# Patient Record
Sex: FEMALE | Race: WHITE | NOT HISPANIC OR LATINO | Employment: OTHER | URBAN - NONMETROPOLITAN AREA
[De-identification: names, ages, dates, MRNs, and addresses within clinical notes are randomized per-mention and may not be internally consistent; named-entity substitution may affect disease eponyms.]

---

## 2020-03-09 PROBLEM — I48.0 PAF (PAROXYSMAL ATRIAL FIBRILLATION) (HCC): Status: ACTIVE | Noted: 2020-03-09

## 2020-06-13 ENCOUNTER — LAB REQUISITION (OUTPATIENT)
Dept: LAB | Facility: HOSPITAL | Age: 80
End: 2020-06-13

## 2020-06-13 DIAGNOSIS — E55.9 VITAMIN D DEFICIENCY, UNSPECIFIED: ICD-10-CM

## 2020-06-13 DIAGNOSIS — E64.2 SEQUELAE OF VITAMIN C DEFICIENCY: ICD-10-CM

## 2020-06-13 DIAGNOSIS — D64.9 ANEMIA, UNSPECIFIED: ICD-10-CM

## 2020-06-13 DIAGNOSIS — I15.9 SECONDARY HYPERTENSION, UNSPECIFIED: ICD-10-CM

## 2020-06-13 LAB
ALBUMIN SERPL-MCNC: 3.75 G/DL (ref 3.5–5.2)
ALBUMIN/GLOB SERPL: 1.1 G/DL
ALP SERPL-CCNC: 128 U/L (ref 39–117)
ALT SERPL W P-5'-P-CCNC: 11 U/L (ref 1–33)
ANION GAP SERPL CALCULATED.3IONS-SCNC: 9.2 MMOL/L (ref 5–15)
AST SERPL-CCNC: 17 U/L (ref 1–32)
BASOPHILS # BLD AUTO: 0.1 10*3/MM3 (ref 0–0.2)
BASOPHILS NFR BLD AUTO: 1 % (ref 0–1.5)
BILIRUB SERPL-MCNC: 0.2 MG/DL (ref 0.2–1.2)
BUN BLD-MCNC: 13 MG/DL (ref 8–23)
BUN/CREAT SERPL: 21.7 (ref 7–25)
CALCIUM SPEC-SCNC: 9.4 MG/DL (ref 8.6–10.5)
CHLORIDE SERPL-SCNC: 105 MMOL/L (ref 98–107)
CO2 SERPL-SCNC: 22.8 MMOL/L (ref 22–29)
CREAT BLD-MCNC: 0.6 MG/DL (ref 0.57–1)
DEPRECATED RDW RBC AUTO: 48.5 FL (ref 37–54)
EOSINOPHIL # BLD AUTO: 0.19 10*3/MM3 (ref 0–0.4)
EOSINOPHIL NFR BLD AUTO: 1.9 % (ref 0.3–6.2)
ERYTHROCYTE [DISTWIDTH] IN BLOOD BY AUTOMATED COUNT: 14 % (ref 12.3–15.4)
GFR SERPL CREATININE-BSD FRML MDRD: 117 ML/MIN/1.73
GFR SERPL CREATININE-BSD FRML MDRD: 96 ML/MIN/1.73
GLOBULIN UR ELPH-MCNC: 3.4 GM/DL
GLUCOSE BLD-MCNC: 96 MG/DL (ref 65–99)
HCT VFR BLD AUTO: 33.3 % (ref 34–46.6)
HGB BLD-MCNC: 10.2 G/DL (ref 12–15.9)
IMM GRANULOCYTES # BLD AUTO: 0.15 10*3/MM3 (ref 0–0.05)
IMM GRANULOCYTES NFR BLD AUTO: 1.5 % (ref 0–0.5)
LYMPHOCYTES # BLD AUTO: 3.83 10*3/MM3 (ref 0.7–3.1)
LYMPHOCYTES NFR BLD AUTO: 38.6 % (ref 19.6–45.3)
MCH RBC QN AUTO: 29.3 PG (ref 26.6–33)
MCHC RBC AUTO-ENTMCNC: 30.6 G/DL (ref 31.5–35.7)
MCV RBC AUTO: 95.7 FL (ref 79–97)
MONOCYTES # BLD AUTO: 0.89 10*3/MM3 (ref 0.1–0.9)
MONOCYTES NFR BLD AUTO: 9 % (ref 5–12)
NEUTROPHILS # BLD AUTO: 4.76 10*3/MM3 (ref 1.7–7)
NEUTROPHILS NFR BLD AUTO: 48 % (ref 42.7–76)
NRBC BLD AUTO-RTO: 0 /100 WBC (ref 0–0.2)
PLAT MORPH BLD: NORMAL
PLATELET # BLD AUTO: 507 10*3/MM3 (ref 140–450)
PMV BLD AUTO: 9.5 FL (ref 6–12)
POTASSIUM BLD-SCNC: 7 MMOL/L (ref 3.5–5.2)
PROT SERPL-MCNC: 7.1 G/DL (ref 6–8.5)
RBC # BLD AUTO: 3.48 10*6/MM3 (ref 3.77–5.28)
RBC MORPH BLD: NORMAL
SODIUM BLD-SCNC: 137 MMOL/L (ref 136–145)
WBC NRBC COR # BLD: 9.92 10*3/MM3 (ref 3.4–10.8)

## 2020-06-13 PROCEDURE — 80053 COMPREHEN METABOLIC PANEL: CPT | Performed by: INTERNAL MEDICINE

## 2020-06-13 PROCEDURE — 85007 BL SMEAR W/DIFF WBC COUNT: CPT | Performed by: INTERNAL MEDICINE

## 2020-06-13 PROCEDURE — 82652 VIT D 1 25-DIHYDROXY: CPT | Performed by: INTERNAL MEDICINE

## 2020-06-13 PROCEDURE — 85025 COMPLETE CBC W/AUTO DIFF WBC: CPT | Performed by: INTERNAL MEDICINE

## 2020-06-14 ENCOUNTER — LAB REQUISITION (OUTPATIENT)
Dept: LAB | Facility: HOSPITAL | Age: 80
End: 2020-06-14

## 2020-06-14 DIAGNOSIS — I10 ESSENTIAL (PRIMARY) HYPERTENSION: ICD-10-CM

## 2020-06-14 DIAGNOSIS — M62.81 MUSCLE WEAKNESS (GENERALIZED): ICD-10-CM

## 2020-06-14 LAB
ANION GAP SERPL CALCULATED.3IONS-SCNC: 11.3 MMOL/L (ref 5–15)
BUN BLD-MCNC: 15 MG/DL (ref 8–23)
BUN/CREAT SERPL: 23.4 (ref 7–25)
CALCIUM SPEC-SCNC: 9.2 MG/DL (ref 8.6–10.5)
CHLORIDE SERPL-SCNC: 105 MMOL/L (ref 98–107)
CO2 SERPL-SCNC: 23.7 MMOL/L (ref 22–29)
CREAT BLD-MCNC: 0.64 MG/DL (ref 0.57–1)
GFR SERPL CREATININE-BSD FRML MDRD: 108 ML/MIN/1.73
GFR SERPL CREATININE-BSD FRML MDRD: 89 ML/MIN/1.73
GLUCOSE BLD-MCNC: 117 MG/DL (ref 65–99)
POTASSIUM BLD-SCNC: 3.6 MMOL/L (ref 3.5–5.2)
SODIUM BLD-SCNC: 140 MMOL/L (ref 136–145)

## 2020-06-14 PROCEDURE — 80048 BASIC METABOLIC PNL TOTAL CA: CPT | Performed by: INTERNAL MEDICINE

## 2020-06-16 ENCOUNTER — LAB REQUISITION (OUTPATIENT)
Dept: LAB | Facility: HOSPITAL | Age: 80
End: 2020-06-16

## 2020-06-16 DIAGNOSIS — I10 ESSENTIAL (PRIMARY) HYPERTENSION: ICD-10-CM

## 2020-06-16 LAB
1,25(OH)2D3 SERPL-MCNC: 11.8 PG/ML (ref 19.9–79.3)
ANION GAP SERPL CALCULATED.3IONS-SCNC: 13.2 MMOL/L (ref 5–15)
BUN BLD-MCNC: 14 MG/DL (ref 8–23)
BUN/CREAT SERPL: 20.6 (ref 7–25)
CALCIUM SPEC-SCNC: 9.2 MG/DL (ref 8.6–10.5)
CHLORIDE SERPL-SCNC: 106 MMOL/L (ref 98–107)
CO2 SERPL-SCNC: 19.8 MMOL/L (ref 22–29)
CREAT BLD-MCNC: 0.68 MG/DL (ref 0.57–1)
GFR SERPL CREATININE-BSD FRML MDRD: 83 ML/MIN/1.73
GLUCOSE BLD-MCNC: 136 MG/DL (ref 65–99)
POTASSIUM BLD-SCNC: 3.9 MMOL/L (ref 3.5–5.2)
SODIUM BLD-SCNC: 139 MMOL/L (ref 136–145)

## 2020-06-16 PROCEDURE — 80048 BASIC METABOLIC PNL TOTAL CA: CPT | Performed by: INTERNAL MEDICINE

## 2020-08-13 ENCOUNTER — CONSULT (OUTPATIENT)
Dept: CARDIOLOGY | Facility: CLINIC | Age: 80
End: 2020-08-13

## 2020-08-13 VITALS
WEIGHT: 130 LBS | DIASTOLIC BLOOD PRESSURE: 61 MMHG | BODY MASS INDEX: 25.52 KG/M2 | HEART RATE: 88 BPM | SYSTOLIC BLOOD PRESSURE: 102 MMHG | HEIGHT: 60 IN

## 2020-08-13 DIAGNOSIS — E78.2 MIXED HYPERLIPIDEMIA: ICD-10-CM

## 2020-08-13 DIAGNOSIS — I48.0 PAF (PAROXYSMAL ATRIAL FIBRILLATION) (HCC): Primary | ICD-10-CM

## 2020-08-13 PROCEDURE — 99214 OFFICE O/P EST MOD 30 MIN: CPT | Performed by: INTERNAL MEDICINE

## 2020-08-13 PROCEDURE — 93000 ELECTROCARDIOGRAM COMPLETE: CPT | Performed by: INTERNAL MEDICINE

## 2020-08-13 RX ORDER — FLECAINIDE ACETATE 150 MG/1
150 TABLET ORAL 2 TIMES DAILY
COMMUNITY
End: 2020-08-13 | Stop reason: SDUPTHER

## 2020-08-13 RX ORDER — FLECAINIDE ACETATE 150 MG/1
150 TABLET ORAL 2 TIMES DAILY
Qty: 60 TABLET | Refills: 5 | Status: SHIPPED | OUTPATIENT
Start: 2020-08-13 | End: 2021-01-26 | Stop reason: SDUPTHER

## 2020-08-13 RX ORDER — CHOLECALCIFEROL (VITAMIN D3) 125 MCG
5 CAPSULE ORAL NIGHTLY
COMMUNITY
End: 2021-03-09

## 2020-08-13 RX ORDER — OMEPRAZOLE 40 MG/1
20 CAPSULE, DELAYED RELEASE ORAL DAILY
COMMUNITY

## 2020-08-13 RX ORDER — METOPROLOL SUCCINATE 25 MG/1
25 TABLET, EXTENDED RELEASE ORAL DAILY
COMMUNITY

## 2020-08-13 RX ORDER — ACETAMINOPHEN 160 MG
2000 TABLET,DISINTEGRATING ORAL DAILY
COMMUNITY

## 2020-08-13 RX ORDER — METOPROLOL SUCCINATE AND HYDROCHLOROTHIAZIDE 25; 12.5 MG/1; MG/1
25 TABLET ORAL
COMMUNITY
End: 2020-08-13

## 2020-08-13 RX ORDER — ACETAMINOPHEN 500 MG
500 TABLET ORAL EVERY 6 HOURS PRN
COMMUNITY

## 2020-08-13 RX ORDER — HYDROCODONE BITARTRATE AND ACETAMINOPHEN 7.5; 325 MG/1; MG/1
1 TABLET ORAL EVERY 6 HOURS PRN
COMMUNITY

## 2020-08-13 NOTE — PROGRESS NOTES
[facility]  INITIAL OFFICE CONSULT    Genet Bryant  : 1940  MRN:[id number]  Patient Address: [address]  [facility] [address]    Date of Encounter: 2020    PCP: Yokasta Ramos, APRN  [address] [facility] [address]  Referring MD: Yokasta Ramos, GEOVANI     IDENTIFICATION: A 79 y.o. female resident of [place], [place]     CC:  Paroxysmal atrial fibrillation/establish care    PROBLEM LIST:   1. Paroxysmal atrial fibrillation  a. Remote ablation, , IDB  b. LHC 2019 [place]: nonobstructive CAD, EF 70-75%  c. Echo 2019: EF 55-65%, grade I diastolic dysfunction   d. CHADsVASC2 = 3, on Xarelto and Flecainide   2. Hyperlipidemia, intolerant to statins and refuses Praluent   3. IBS  4. GERD  5. Back pain     ALLERGIES:   Allergies   Allergen Reactions   • Statins Palpitations     CURRENT MEDICATIONS:   •  acetaminophen (TYLENOL) 500 MG tablet, Take 500 mg by mouth Every 6 (Six) Hours As Needed for Mild Pain  •  Cholecalciferol (VITAMIN D3) 50 MCG (2000 UT) capsule, Take 2,000 Units by mouth Daily  •  diphenhydrAMINE HCl (BENADRYL ALLERGY PO), Take  by mouth As Needed.  •  flecainide (TAMBOCOR) 150 MG tablet, Take 150 mg by mouth 2 (Two) Times a Day (PATIENT TAKING ONCE DAILY)  •  FLUOXETINE HCL PO, Take 100 mg by mouth Daily  •  HYDROcodone-acetaminophen (NORCO) 7.5-325 MG per tablet, Take 1 tablet by mouth Every 6 (Six) Hours As Needed for Moderate Pain  •  melatonin 5 MG tablet tablet, Take 5 mg by mouth Every Night.  •  Metoprolol ER 25 MG tablet sustained-release 24 hour, daily  •  omeprazole (priLOSEC) 40 MG capsule, Take 40 mg by mouth Daily  •  rivaroxaban (XARELTO) 20 MG tablet, Take 20 mg by mouth Daily    HPI: Mrs. Bryant is a pleasant 80 y/o WF with history of PAF, and HLD who presents to establish care. She recently moved from [place] to be closer to her daughters. She has a history of PAF for 15-20 years, and had a remote ablation. She has also  "been taking Flecainide and Xarelto for a long time and denies any significant side effects. She has however cut her dose of Flecainide to 150mg daily instead of twice daily for the past several months. She lives independently and is active at home, doing her own housework and walking about 3 miles daily on average. She recently fell and broke her right ankle, and is currently not able to get around as well. She reports a brief episode of Afib with RVR in June while she was participating in rehab. Heart rates were reportedly up to 180s and lasted for about 1 hour, however no formal EKG was obtained. She has rare break-through episodes of Afib. She denies angina, exertional dyspnea, syncope or heart failure symptoms. No tobacco, alcohol or drug use.      Cardiac Risk Factors include: advanced age (older than 55 for men, 65 for women) and dyslipidemia    ROS: All systems have been reviewed and are negative with the exception of those mentioned in the HPI and problem list above.    Surgical History:   Past Surgical History:   Procedure Laterality Date   • APPENDECTOMY     • CHOLECYSTECTOMY     • FOOT SURGERY     • HEMORRHOIDECTOMY     • HYSTERECTOMY       Social History:   Social History     Socioeconomic History   • Marital status: Single   Tobacco Use   • Smoking status: Never Smoker   • Smokeless tobacco: Never Used   Substance and Sexual Activity   • Alcohol use: Not Currently   • Drug use: Never     Family History: History reviewed. No pertinent family history.    Objective     Vitals:    08/13/20 1116 08/13/20 1117 08/13/20 1118   BP: 127/74 126/78 102/61   BP Location: Right arm Left arm Left arm   Patient Position: Sitting Sitting Standing   Pulse: 82 81 88   Weight: 59 kg (130 lb)     Height: 152.4 cm (60\")       Body mass index is 25.39 kg/m².    PHYSICAL EXAM:  CONSTITUTIONAL: Well nourished, cooperative, in no acute distress  HEENT: Normocephalic, atraumatic, PERRLA, no JVD  CARDIOVASCULAR:  Regular rhythm " and normal rate, no murmur, gallop, rub. Peripheral pulses are present   RESPIRATORY: Clear to auscultation, normal respiratory effort, no wheezing, rales or ronchi  GI: Soft, nontender, normal bowel sounds  MUSCULOSKELETAL: RLE in boot with larger RLE than LLE. No edema in LLE.   SKIN: Warm, dry. No bleeding, bruising or rash  NEUROLOGICAL: No focal deficits  PSYCHIATRIC: Normal mood and affect. Behavior is normal     Labs/Diagnostic Data  Labs 01/2019:  CBC WNL  Lipid Panel: , HDL 30, , Trig 190  A1C 5.7%  TSH 0.12, Free T4 1.3  CMP 5/2019: WNL    Lab Results   Component Value Date    WBC 9.92 06/13/2020    HGB 10.2 (L) 06/13/2020    HCT 33.3 (L) 06/13/2020    MCV 95.7 06/13/2020     (H) 06/13/2020     Lab Results   Component Value Date    GLUCOSE 136 (H) 06/16/2020    BUN 14 06/16/2020    CREATININE 0.68 06/16/2020    EGFRIFNONA 83 06/16/2020    EGFRIFAFRI 108 06/14/2020    BCR 20.6 06/16/2020    K 3.9 06/16/2020    CO2 19.8 (L) 06/16/2020    CALCIUM 9.2 06/16/2020    ALBUMIN 3.75 06/13/2020    AST 17 06/13/2020    ALT 11 06/13/2020       ECG 12 Lead  Date/Time: 8/13/2020 5:32 PM  Performed by: Comfort Gomez PA-C  Authorized by: Comfort Gomez PA-C   Comparison: not compared with previous ECG   Previous ECG: no previous ECG available  Rhythm: sinus rhythm  Rate: normal  BPM: 84  Conduction: conduction normal  Other findings: low voltage    Clinical impression: abnormal EKG  Comments: Low voltage limb leads  Baseline artifact         Radiology Data:   CXR 3/9/20: No acute cardiopulmonary process    Assessment and Plan:     1. Paroxysmal atrial fibrillation: We have recommended she take Flecainide 150mg twice daily instead of once daily and re-check an EKG in 1 month in [facility]. She does not have frequent episodes of break-through Afib and is anticoagulated with Xarelto 20mg. The use of twice daily flecainide should improve her control of AF.  2. History of thyroid  nodules: she has follow up scheduled for re-check, and we again stressed importance of this.    3. HLD: intolerant to several statins and refuses trial of PCSK-9 inhibitor or other treatment.   4. Follow up in 6 months or sooner as needed.     Thank you for allowing me to participate in the care of Genet Bryant. Feel free to contact me directly with any further questions or concerns.    Scribed for Carlos Alberto Gunn MD by Comfort Gomez PA-C. 8/13/2020  17:36

## 2021-01-26 RX ORDER — FLECAINIDE ACETATE 150 MG/1
150 TABLET ORAL 2 TIMES DAILY
Qty: 60 TABLET | Refills: 1 | Status: SHIPPED | OUTPATIENT
Start: 2021-01-26 | End: 2021-03-09 | Stop reason: SDUPTHER

## 2021-01-27 ENCOUNTER — TELEPHONE (OUTPATIENT)
Dept: CARDIOLOGY | Facility: CLINIC | Age: 81
End: 2021-01-27

## 2021-01-27 NOTE — TELEPHONE ENCOUNTER
Pt was seen in August and Flecainide changed to twice a day dosing. EKG ordered for 48 hours later but Comfort noticed that EKG was not completed.    LM for pt to return call    Called [facility] and PCP office. NO EKGs on file.     Next appt with DOLLY 3/9/21 (will need EKG for further refills).

## 2021-02-09 ENCOUNTER — IMMUNIZATION (OUTPATIENT)
Dept: VACCINE CLINIC | Facility: HOSPITAL | Age: 81
End: 2021-02-09

## 2021-02-09 PROCEDURE — 0001A: CPT | Performed by: INTERNAL MEDICINE

## 2021-02-09 PROCEDURE — 91300 HC SARSCOV02 VAC 30MCG/0.3ML IM: CPT | Performed by: INTERNAL MEDICINE

## 2021-03-02 ENCOUNTER — IMMUNIZATION (OUTPATIENT)
Dept: VACCINE CLINIC | Facility: HOSPITAL | Age: 81
End: 2021-03-02

## 2021-03-02 PROCEDURE — 91300 HC SARSCOV02 VAC 30MCG/0.3ML IM: CPT | Performed by: INTERNAL MEDICINE

## 2021-03-02 PROCEDURE — 0002A: CPT | Performed by: INTERNAL MEDICINE

## 2021-03-09 ENCOUNTER — OFFICE VISIT (OUTPATIENT)
Dept: CARDIOLOGY | Facility: CLINIC | Age: 81
End: 2021-03-09

## 2021-03-09 VITALS
BODY MASS INDEX: 27.8 KG/M2 | WEIGHT: 141.6 LBS | TEMPERATURE: 97 F | HEART RATE: 84 BPM | SYSTOLIC BLOOD PRESSURE: 107 MMHG | OXYGEN SATURATION: 95 % | HEIGHT: 60 IN | DIASTOLIC BLOOD PRESSURE: 69 MMHG

## 2021-03-09 DIAGNOSIS — I48.0 PAROXYSMAL ATRIAL FIBRILLATION (HCC): Primary | ICD-10-CM

## 2021-03-09 PROCEDURE — 99213 OFFICE O/P EST LOW 20 MIN: CPT | Performed by: INTERNAL MEDICINE

## 2021-03-09 PROCEDURE — 93000 ELECTROCARDIOGRAM COMPLETE: CPT | Performed by: INTERNAL MEDICINE

## 2021-03-09 RX ORDER — FLUOXETINE 10 MG/1
10 CAPSULE ORAL DAILY
COMMUNITY
Start: 2021-03-04

## 2021-03-09 RX ORDER — FLECAINIDE ACETATE 100 MG/1
100 TABLET ORAL 2 TIMES DAILY
Qty: 60 TABLET | Refills: 11 | Status: SHIPPED | OUTPATIENT
Start: 2021-03-09

## 2021-03-09 NOTE — PROGRESS NOTES
OFFICE FOLLOW UP     Date of Encounter:2021     Name: Genet Bryant  : 1940  Address:  [address] [facility] [place] [address]    PCP: Yokasta Ramos, APRN  [address] [facility] [place] [address]    Genet Bryant is a 80 y.o. female.      Chief Complaint: Follow up of PAF    Problem List:   1. Paroxysmal atrial fibrillation  a. Remote ablation, , IDB  b. C 2019 [place]: nonobstructive CAD, EF 70-75%  c. Echo 2019: EF 55-65%, grade I diastolic dysfunction   d. CHADsVASC2 = 3, on Xarelto and Flecainide   2. Hyperlipidemia, intolerant to statins and refuses Praluent   3. IBS  4. GERD  5. Back pain     Allergies:  Allergies   Allergen Reactions   • Statins Palpitations       Current Medications:  •  acetaminophen (TYLENOL) 500 MG tablet, Take 500 mg by mouth Every 6 (Six) Hours As Needed for Mild Pain  •  ALBUTEROL IN, Inhale 2 puffs As Needed  •  Cholecalciferol (VITAMIN D3) 50 MCG (2000 UT) capsule, Take 2,000 Units by mouth Daily.  •  diphenhydrAMINE HCl (BENADRYL ALLERGY PO), Take  by mouth As Needed.   •  flecainide (TAMBOCOR) 150 MG tablet, Take 1 tablet by mouth 2 (Two) Times a Day  •  FLUoxetine (PROzac) 10 MG capsule, Take 10 mg by mouth Daily  •  HYDROcodone-acetaminophen (NORCO) 7.5-325 MG per tablet, Take 1 tablet by mouth Every 6 (Six) Hours As Needed for Moderate Pain .   •  metoprolol succinate XL (TOPROL-XL) 25 MG 24 hr tablet, Take 25 mg by mouth Daily  •  omeprazole (priLOSEC) 40 MG capsule, Take 20 mg by mouth Daily.  •  rivaroxaban (XARELTO) 20 MG tablet, Take 20 mg by mouth Daily    History of Present Illness: Genet Bryant returns for follow up today. She is accompanied by her daughter. She states she was treated for nodule on thyroid with radiation pill and has not experienced atrial fibrillation since. She she feels great. Dr. Dominguez is following her thyroid issues in [place]. She continues medications listed above including flecainide and Xarelto. She denies angina  "and symptoms of heart failure. She has received both COVID vaccines. She denies any issues with unusual bleeding.     The following portions of the patient's history were reviewed and updated as appropriate: allergies, current medications and problem list.    ROS: Pertinent positives as listed in the HPI.  All other systems reviewed and negative.    Objective:    Vitals:    03/09/21 1447 03/09/21 1454   BP: 120/70 107/69   BP Location: Left arm Left arm   Patient Position: Sitting Standing   Pulse: 88 84   Temp: 97 °F (36.1 °C)    SpO2: 96% 95%   Weight: 64.2 kg (141 lb 9.6 oz)    Height: 152.4 cm (60\")      Body mass index is 27.65 kg/m².    Physical Exam:  GENERAL: Alert, cooperative, in no acute distress.   HEENT: Normocephalic, no adenopathy, no jugular venous distention  HEART: No discrete PMI is noted. Regular rhythm, normal rate, and no murmurs, gallops, or rubs.   LUNGS: Clear to auscultation bilaterally. No wheezing, rales or ronchi.  ABDOMEN: Soft, bowel sounds present, non-tender   NEUROLOGIC: No focal abnormalities involving strength or sensation are noted.   EXTREMITIES: No clubbing, cyanosis, or edema noted.      Diagnostic Data:    No new labs available to review.       ECG 12 Lead    Date/Time: 3/16/2021 5:20 PM  Performed by: Carlos Alberto Gunn MD  Authorized by: Carlos Alberto Gunn MD   Comparison: compared with previous ECG from 9/8/2020  BPM: 68  Conduction: non-specific intraventricular conduction delay    Clinical impression: abnormal EKG  Comments: ectopic atrial rhythm              Assessment and Plan:   1.  PAF:  No recurrence since thyroid nodule treated. EKG reviewed today. We will decrease flecainide to 100 mg twice daily and see her back in one year with EKG. She will call should tachy palpitations or recurrence be noted. Continue Xarelto.     I will see Genet Bryant back in one year or sooner on an as needed basis.        EMR Dragon/Transcription Disclaimer:  Much of this encounter " note is an electronic transcription/translation of spoken language to printed text.  The electronic translation of spoken language may permit erroneous, or at times, nonsensical words or phrases to be inadvertently transcribed.  Although I have reviewed the note for such errors, some may still exist.